# Patient Record
Sex: MALE | Race: WHITE | Employment: FULL TIME | URBAN - METROPOLITAN AREA
[De-identification: names, ages, dates, MRNs, and addresses within clinical notes are randomized per-mention and may not be internally consistent; named-entity substitution may affect disease eponyms.]

---

## 2017-02-15 PROBLEM — K40.90 INGUINAL HERNIA: Status: ACTIVE | Noted: 2017-02-15

## 2018-11-09 ENCOUNTER — HOSPITAL ENCOUNTER (OUTPATIENT)
Dept: PHYSICAL THERAPY | Age: 58
Discharge: HOME | End: 2018-11-09
Payer: COMMERCIAL

## 2018-11-09 PROCEDURE — 97163 PT EVAL HIGH COMPLEX 45 MIN: CPT

## 2018-11-09 PROCEDURE — 97110 THERAPEUTIC EXERCISES: CPT

## 2018-11-09 PROCEDURE — 97535 SELF CARE MNGMENT TRAINING: CPT

## 2018-11-09 NOTE — PROGRESS NOTES
PT DAILY TREATMENT NOTE 10-18 Patient Name: [facility] Date:2018 : 1960 [x]  Patient  Verified Payor: BLUE CROSS / Plan: [address] [place] [address] / Product Type: PPO / In time:142  Out time:226 Total Treatment Time (min): 44 Visit #: 1 of 12 Medicare/BCBS Only Total Timed Codes (min):  24 1:1 Treatment Time:  44 Treatment Area: Wedge compression fracture of first lumbar vertebra, initial encounter for closed fracture [S32.010A] SUBJECTIVE Pain Level (0-10 scale): 5 Any medication changes, allergies to medications, adverse drug reactions, diagnosis change, or new procedure performed?: [x] No    [] Yes (see summary sheet for update) Subjective functional status/changes:   [] No changes reported See eval 
 
OBJECTIVE 20 min [x]Eval                  []Re-Eval  
 
 
14 min Therapeutic Exercise:  [] See flow sheet :  
Rationale: increase ROM and increase strength to improve the patients ability to perform daily activities Self-care: 10' With 
 [] TE 
 [] TA 
 [] neuro 
 [] other: Patient Education: [x] Review HEP [] Progressed/Changed HEP based on:  
[] positioning   [] body mechanics   [] transfers   [] heat/ice application   
[] other:   
 
Other Objective/Functional Measures:   
 
Pain Level (0-10 scale) post treatment: 3 
 
ASSESSMENT/Changes in Function: see POC Patient will continue to benefit from skilled PT services to modify and progress therapeutic interventions, address functional mobility deficits, address ROM deficits, address strength deficits, analyze and address soft tissue restrictions and analyze and cue movement patterns to attain remaining goals. [x]  See Plan of Care 
[]  See progress note/recertification 
[]  See Discharge Summary Progress towards goals / Updated goals: 
Short Term Goals: To be accomplished in 2 weeks: 1. I and compliant with HEP for self management of symptoms. 2. Ambulate on TM x 5' without exacerbation of symptoms to increase tolerance for community ambulation. Long Term Goals: To be accomplished in 6 weeks: 1. Improve FOTO to 55 to indicate improved function with daily activities. 2. Increase B hip strength to grossly 4/5 to increase ease with community ambulation. 3. Perform core exercises without rectus bulge or pain to indicate improve TA/stability for patient to return to work PLAN 
[]  Upgrade activities as tolerated     [x]  Continue plan of care 
[]  Update interventions per flow sheet      
[]  Discharge due to:_ 
[]  Other:_ Nayan Glover, PT 11/9/2018  3:33 PM 
 
Future Appointments Date Time Provider Keny Grider 11/13/2018  4:30 PM Indio Sims [facility]  
11/16/2018 10:30 AM Flaquita Moore PTA [facility]  
11/20/2018  3:30 PM Flaquita Moore PTA [facility]  
11/23/2018  8:00 AM Flaquita Moore PTA [facility]  
11/27/2018  4:00 PM Flaquita Moore PTA [facility]  
11/30/2018 10:00 AM Jaret Aquino PT [facility]

## 2018-11-09 NOTE — PROGRESS NOTES
In [address]  [address] [place], [address] 
[phone] fax Plan of Care/ Statement of Necessity for Physical Therapy Services Patient name: Joan Morton Start of Care: 2018 Referral source: Jerrell Izquierdo MD : 1960 Medical Diagnosis: Wedge compression fracture of first lumbar vertebra, initial encounter for closed fracture [S32.010A] Payor: BLUE CROSS / Plan: [address] [place] [address] / Product Type: PPO /  Onset Date:18; right inguinal hernia repair 10/19/18 Treatment Diagnosis: LBP 2/2 L1 compression fx Prior Hospitalization: see medical history Provider#: [id number] Medications: Verified on Patient summary List  
 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 Assessment/ key information: 62y.o. year old male presents with CC of back pain and inguinal pain s/p fall from ladder with subsequent L1 compression fx and right hernia repair on 10/19/18. Impairments noted: difficulty with sit <>stand and sit<>supine transfers 2/2 pain; decreased tolerance for prolonged standing/walking; decreased core and hip strength grossly. Patient will benefit from physical therapy to address deficits, and ultimately to return patient to prior level of function. Evaluation Complexity History MEDIUM  Complexity : 1-2 comorbidities / personal factors will impact the outcome/ POC ; Examination HIGH Complexity : 4+ Standardized tests and measures addressing body structure, function, activity limitation and / or participation in recreation  ;Presentation MEDIUM Complexity : Evolving with changing characteristics  ; Clinical Decision Making MEDIUM Complexity : FOTO score of 26-74 Overall Complexity Rating: HIGH  
 Problem List: pain affecting function, decrease ROM, decrease strength, impaired gait/ balance, decrease ADL/ functional abilitiies, decrease activity tolerance, decrease flexibility/ joint mobility and decrease transfer abilities Treatment Plan may include any combination of the following: Therapeutic exercise, Neuromuscular re-education, Physical agent/modality, Gait/balance training and Patient education Patient / Family readiness to learn indicated by: asking questions, trying to perform skills and interest 
Persons(s) to be included in education: patient (P) Barriers to Learning/Limitations: Hernia repair limits the exercises that patient can safely perform for LBP Patient Goal (s): to excel in healing process and heal faster Patient Self Reported Health Status: poor Rehabilitation Potential: good Short Term Goals: To be accomplished in 2 weeks: 1. I and compliant with HEP for self management of symptoms. 2. Ambulate on TM x 5' without exacerbation of symptoms to increase tolerance for community ambulation. Long Term Goals: To be accomplished in 6 weeks: 1. Improve FOTO to 55 to indicate improved function with daily activities. 2. Increase B hip strength to grossly 4/5 to increase ease with community ambulation. 3. Perform core exercises without rectus bulge or pain to indicate improve TA/stability for patient to return to work. Frequency / Duration: Patient to be seen 2 times per week for 6 weeks. Patient/ Caregiver education and instruction: Diagnosis, prognosis, self care, activity modification and exercises 
 [x]  Plan of care has been reviewed with JODIE Luo, PT 11/9/2018 3:21 PM 
 
________________________________________________________________________ I certify that the above Therapy Services are being furnished while the patient is under my care. I agree with the treatment plan and certify that this therapy is necessary. [de-identified] Signature:____________Date:_________TIME:________ 
 
[employer], Date and Time must be completed for valid certification ** Please sign and return to [address] [place], [address] 
[phone] fax

## 2018-11-13 ENCOUNTER — HOSPITAL ENCOUNTER (OUTPATIENT)
Dept: PHYSICAL THERAPY | Age: 58
Discharge: HOME | End: 2018-11-13
Payer: COMMERCIAL

## 2018-11-13 PROCEDURE — 97112 NEUROMUSCULAR REEDUCATION: CPT

## 2018-11-13 PROCEDURE — 97110 THERAPEUTIC EXERCISES: CPT

## 2018-11-13 NOTE — PROGRESS NOTES
PT DAILY TREATMENT NOTE 10-18 Patient Name: [facility] Date:2018 : 1960 [x]  Patient  Verified Payor: BLUE CROSS / Plan: [address] [place] [address] / Product Type: PPO / In time:4:33  Out time:5:04 Total Treatment Time (min): 31 Visit #: 2 of 12 Medicare/BCBS Only Total Timed Codes (min):  31 1:1 Treatment Time:  31  
 
 
Treatment Area: Wedge compression fracture of first lumbar vertebra, initial encounter for closed fracture [S32.010A] SUBJECTIVE Pain Level (0-10 scale): 2-3/10 Any medication changes, allergies to medications, adverse drug reactions, diagnosis change, or new procedure performed?: [x] No    [] Yes (see summary sheet for update) Subjective functional status/changes:   [] No changes reported He reports HEP semi-compliance. OBJECTIVE 23 min Therapeutic Exercise:  [x] See flow sheet :  
Rationale: increase ROM, increase strength and improve coordination to improve the patients ability to increase ease with ADLs 8 min Neuromuscular Re-education:  [x]  See flow sheet :   
Rationale: increase strength, improve coordination and increase proprioception  to improve the patients ability to improve hip stability and pelvic awareness With 
 [] TE 
 [] TA 
 [] neuro 
 [] other: Patient Education: [x] Review HEP [] Progressed/Changed HEP based on:  
[] positioning   [] body mechanics   [] transfers   [] heat/ice application   
[] other:   
 
Other Objective/Functional Measures:  
First follow up session---cues sequencing and correct form throughout Pain Level (0-10 scale) post treatment: 2-3/10 ASSESSMENT/Changes in Function:  
Initiated POC per flowsheet. Patient puts forth good effort with exercises. Slow and guarded movement throughout session. He reports HEP semi-compliance.  
 
 
Patient will continue to benefit from skilled PT services to modify and progress therapeutic interventions, address functional mobility deficits, address ROM deficits, address strength deficits, analyze and address soft tissue restrictions, analyze and cue movement patterns, analyze and modify body mechanics/ergonomics and assess and modify postural abnormalities to attain remaining goals. []  See Plan of Care 
[]  See progress note/recertification 
[]  See Discharge Summary Progress towards goals / Updated goals: 
Short Term Goals: To be accomplished in 2 weeks: 
             1. I and compliant with HEP for self management of symptoms.  
2. Ambulate on TM x 5' without exacerbation of symptoms to increase tolerance for community ambulation. Long Term Goals: To be accomplished in 6 weeks: 
             1. Improve FOTO to 55 to indicate improved function with daily activities. 2. Increase B hip strength to grossly 4/5 to increase ease with community ambulation. 3. Perform core exercises without rectus bulge or pain to indicate improve TA/stability for patient to return to work 
  
 
 
 
PLAN 
[]  Upgrade activities as tolerated     [x]  Continue plan of care 
[]  Update interventions per flow sheet      
[]  Discharge due to:_ 
[]  Other:_   
 
Hima Thorpe 11/13/2018  4:31 PM 
 
Future Appointments Date Time Provider Keny Grider 11/16/2018 10:30 AM Ramonita Hoffman PTA [facility]  
11/20/2018  3:30 PM Ramonita Hoffman PTA [facility]  
11/23/2018  8:00 AM Ramonita Hoffman PTA [facility]  
11/27/2018  4:00 PM Ramonita Hoffman PTA [facility]  
11/30/2018 10:00 AM Kevin Almeida, PT [facility]

## 2018-11-16 ENCOUNTER — HOSPITAL ENCOUNTER (OUTPATIENT)
Dept: PHYSICAL THERAPY | Age: 58
Discharge: HOME | End: 2018-11-16
Payer: COMMERCIAL

## 2018-11-16 PROCEDURE — 97112 NEUROMUSCULAR REEDUCATION: CPT

## 2018-11-16 PROCEDURE — 97110 THERAPEUTIC EXERCISES: CPT

## 2018-11-16 NOTE — PROGRESS NOTES
PT DAILY TREATMENT NOTE 10-18 Patient Name: [facility] Date:2018 : 1960 [x]  Patient  Verified Payor: BLUE CROSS / Plan: [address] [place] [address] / Product Type: PPO / In time:10:33  Out time:11:23 Total Treatment Time (min): 50 Visit #: 3 of 12 Medicare/BCBS Only Total Timed Codes (min):  40 1:1 Treatment Time:  21 Treatment Area: Wedge compression fracture of first lumbar vertebra, initial encounter for closed fracture [S32.010A] SUBJECTIVE Pain Level (0-10 scale): 3-4/10 Any medication changes, allergies to medications, adverse drug reactions, diagnosis change, or new procedure performed?: [x] No    [] Yes (see summary sheet for update) Subjective functional status/changes:   [] No changes reported \"Considering everything I'm okay. \" OBJECTIVE Modality rationale: decrease pain to improve the patients ability to perform ADL's. Min Type Additional Details  
 [] Estim:  []Unatt       []IFC  []Premod []Other:  []w/ice   []w/heat Position: Location:  
 [] Estim: []Att    []TENS instruct  []NMES []Other:  []w/US   []w/ice   []w/heat Position: Location:  
 []  Traction: [] Cervical       []Lumbar 
                     [] Prone          []Supine []Intermittent   []Continuous Lbs: 
[] before manual 
[] after manual  
 []  Ultrasound: []Continuous   [] Pulsed []1MHz   []3MHz W/cm2: 
Location:  
 []  Iontophoresis with dexamethasone Location: [] Take home patch  
[] In clinic  
10 [x]  Ice     []  heat 
[]  Ice massage 
[]  Laser  
[]  Anodyne Position: Supine Location: L/S  
 []  Laser with stim 
[]  Other:  Position: Location:  
 []  Vasopneumatic Device Pressure:       [] lo [] med [] hi  
Temperature: [] lo [] med [] hi  
[] Skin assessment post-treatment:  []intact []redness- no adverse reaction 
  []redness  adverse reaction: 32 min Therapeutic Exercise:  [x] See flow sheet :  
Rationale: increase ROM and increase strength to improve the patients ability to perform ADL's. 
  
8 min Neuromuscular Re-education:  [x]  See flow sheet :  
Rationale: increase strength and increase proprioception  to improve the patients ability to perform functional activities. With 
 [x] TE 
 [] TA 
 [] neuro 
 [] other: Patient Education: [x] Review HEP [] Progressed/Changed HEP based on:  
[] positioning   [] body mechanics   [] transfers   [] heat/ice application   
[] other:   
 
Other Objective/Functional Measures: Increased several exercises to 15 reps. Pain Level (0-10 scale) post treatment: 0/10 ASSESSMENT/Changes in Function: Performs exercises slowly and guarded, fully participated in treatment. Patient will continue to benefit from skilled PT services to modify and progress therapeutic interventions, address functional mobility deficits, address ROM deficits, address strength deficits, analyze and cue movement patterns and analyze and modify body mechanics/ergonomics to attain remaining goals. [x]  See Plan of Care 
[]  See progress note/recertification 
[]  See Discharge Summary Progress towards goals / Updated goals: 
Short Term Goals: To be accomplished in 2 weeks: 
             1. I and compliant with HEP for self management of symptoms.  - Pt reports HEP compliance. 11/16/2018 2. Ambulate on TM x 5' without exacerbation of symptoms to increase tolerance for community ambulation. Long Term Goals: To be accomplished in 6 weeks: 
             1. Improve FOTO to 55 to indicate improved function with daily activities. 2. Increase B hip strength to grossly 4/5 to increase ease with community ambulation. 3. Perform core exercises without rectus bulge or pain to indicate improve TA/stability for patient to return to work PLAN 
[]  Upgrade activities as tolerated     [x]  Continue plan of care []  Update interventions per flow sheet      
[]  Discharge due to:_ 
[]  Other:_   
 
Mo Simms, PTA 11/16/2018  10:34 AM 
 
Future Appointments Date Time Provider Keny Grider 11/20/2018  3:30 PM Richard Gamez, JODIE [facility]  
11/23/2018  8:00 AM Richard Gamez, PTA [facility]  
11/27/2018  4:00 PM Richard Gamez, PTA [facility]  
11/30/2018 10:00 AM Zahira Gage, PT [facility]

## 2018-11-20 ENCOUNTER — HOSPITAL ENCOUNTER (OUTPATIENT)
Dept: PHYSICAL THERAPY | Age: 58
Discharge: HOME | End: 2018-11-20
Payer: COMMERCIAL

## 2018-11-20 PROCEDURE — 97110 THERAPEUTIC EXERCISES: CPT

## 2018-11-20 PROCEDURE — 97112 NEUROMUSCULAR REEDUCATION: CPT

## 2018-11-20 NOTE — PROGRESS NOTES
PT DAILY TREATMENT NOTE 10-18 Patient Name: [facility] Date:2018 : 1960 [x]  Patient  Verified Payor: BLUE CROSS / Plan: [address] [place] [address] / Product Type: PPO / In time:3:31  Out time:4:15 Total Treatment Time (min): 44 Visit #: 4 of 12 Medicare/BCBS Only Total Timed Codes (min):  34 1:1 Treatment Time:  34 Treatment Area: Wedge compression fracture of first lumbar vertebra, initial encounter for closed fracture [S32.010A] SUBJECTIVE Pain Level (0-10 scale): 3-410 Any medication changes, allergies to medications, adverse drug reactions, diagnosis change, or new procedure performed?: [x] No    [] Yes (see summary sheet for update) Subjective functional status/changes:   [] No changes reported \"Sore today, did stuff I shouldn't have done yesterday and I'm paying for it today. \" OBJECTIVE Modality rationale: decrease inflammation and decrease pain to improve the patients ability to perform ADL's. Min Type Additional Details  
 [] Estim:  []Unatt       []IFC  []Premod []Other:  []w/ice   []w/heat Position: Location:  
 [] Estim: []Att    []TENS instruct  []NMES []Other:  []w/US   []w/ice   []w/heat Position: Location:  
 []  Traction: [] Cervical       []Lumbar 
                     [] Prone          []Supine []Intermittent   []Continuous Lbs: 
[] before manual 
[] after manual  
 []  Ultrasound: []Continuous   [] Pulsed []1MHz   []3MHz W/cm2: 
Location:  
 []  Iontophoresis with dexamethasone Location: [] Take home patch  
[] In clinic  
10 [x]  Ice     []  heat 
[]  Ice massage 
[]  Laser  
[]  Anodyne Position: Supine Location: L/S  
 []  Laser with stim 
[]  Other:  Position: Location:  
 []  Vasopneumatic Device Pressure:       [] lo [] med [] hi  
Temperature: [] lo [] med [] hi  
 [] Skin assessment post-treatment:  []intact []redness- no adverse reaction 
  []redness  adverse reaction:  
 
26 min Therapeutic Exercise:  [x] See flow sheet :  
Rationale: increase ROM and increase strength to improve the patients ability to perform ADL's. 
 
8 min Neuromuscular Re-education:  [x]  See flow sheet :  
Rationale: increase strength and increase proprioception  to improve the patients ability to perform functional activities. With 
 [x] TE 
 [] TA 
 [] neuro 
 [] other: Patient Education: [x] Review HEP [] Progressed/Changed HEP based on:  
[] positioning   [] body mechanics   [] transfers   [] heat/ice application   
[] other:   
 
Other Objective/Functional Measures: Increased reps for several exercises. Pain Level (0-10 scale) post treatment: 0/10 ASSESSMENT/Changes in Function: TM warm up for 5' without pain level increasing. Pt reported exercises where slightly easier to perform. Decrease in pain level post-modalities. Patient will continue to benefit from skilled PT services to modify and progress therapeutic interventions, address functional mobility deficits, address ROM deficits, address strength deficits, analyze and cue movement patterns and analyze and modify body mechanics/ergonomics to attain remaining goals. [x]  See Plan of Care 
[]  See progress note/recertification 
[]  See Discharge Summary Progress towards goals / Updated goals: 
Short Term Goals: To be accomplished in 2 weeks: 
             1. I and compliant with HEP for self management of symptoms.  - Pt reports HEP compliance. 11/16/2018 2. Ambulate on TM x 5' without exacerbation of symptoms to increase tolerance for community ambulation. - TM warm up for 5' without pain level increasing. 11/20/2018 Long Term Goals: To be accomplished in 6 weeks: 
             1. Improve FOTO to 55 to indicate improved function with daily activities. 2. Increase B hip strength to grossly 4/5 to increase ease with community ambulation. 3. Perform core exercises without rectus bulge or pain to indicate improve TA/stability for patient to return to work PLAN 
[]  Upgrade activities as tolerated     [x]  Continue plan of care 
[]  Update interventions per flow sheet      
[]  Discharge due to:_ 
[]  Other:_   
 
Graciela Lyons PTA 11/20/2018  3:30 PM 
 
Future Appointments Date Time Provider Keny Grider 11/23/2018  8:00 AM Eveline Ford PTA [facility]  
11/27/2018  4:00 PM Eveline Ford PTA [facility]  
11/30/2018 10:00 AM Larry Almendarez PT [facility]

## 2018-11-23 ENCOUNTER — APPOINTMENT (OUTPATIENT)
Dept: PHYSICAL THERAPY | Age: 58
End: 2018-11-23
Payer: COMMERCIAL

## 2018-11-27 ENCOUNTER — HOSPITAL ENCOUNTER (OUTPATIENT)
Dept: PHYSICAL THERAPY | Age: 58
Discharge: HOME | End: 2018-11-27
Payer: COMMERCIAL

## 2018-11-27 PROCEDURE — 97110 THERAPEUTIC EXERCISES: CPT

## 2018-11-27 PROCEDURE — 97112 NEUROMUSCULAR REEDUCATION: CPT

## 2018-11-27 NOTE — PROGRESS NOTES
PT DAILY TREATMENT NOTE 10-18 Patient Name: [facility] Date:2018 : 1960 [x]  Patient  Verified Payor: BASHIR SAMMIE / Plan: [address] [place] [address] / Product Type: PPO / In time:4:10  Out time:4:52 Total Treatment Time (min): 42 Visit #: 5 of 12 Medicare/BCBS Only Total Timed Codes (min):  32 1:1 Treatment Time:  32 Treatment Area: Wedge compression fracture of first lumbar vertebra, initial encounter for closed fracture [S32.010A] SUBJECTIVE Pain Level (0-10 scale): 0/10 Any medication changes, allergies to medications, adverse drug reactions, diagnosis change, or new procedure performed?: [x] No    [] Yes (see summary sheet for update) Subjective functional status/changes:   [] No changes reported \"No pain right now, just very stiff. \" 
Pt late for appointment. OBJECTIVE Modality rationale: decrease inflammation and decrease pain to improve the patients ability to perform ADL's. Min Type Additional Details  
 [] Estim:  []Unatt       []IFC  []Premod []Other:  []w/ice   []w/heat Position: Location:  
 [] Estim: []Att    []TENS instruct  []NMES []Other:  []w/US   []w/ice   []w/heat Position: Location:  
 []  Traction: [] Cervical       []Lumbar 
                     [] Prone          []Supine []Intermittent   []Continuous Lbs: 
[] before manual 
[] after manual  
 []  Ultrasound: []Continuous   [] Pulsed []1MHz   []3MHz W/cm2: 
Location:  
 []  Iontophoresis with dexamethasone Location: [] Take home patch  
[] In clinic  
10 [x]  Ice     []  heat 
[]  Ice massage 
[]  Laser  
[]  Anodyne Position: Seated Location: L/S  
 []  Laser with stim 
[]  Other:  Position: Location:  
 []  Vasopneumatic Device Pressure:       [] lo [] med [] hi  
Temperature: [] lo [] med [] hi  
[] Skin assessment post-treatment:  []intact []redness- no adverse reaction []redness  adverse reaction:  
 
22 min Therapeutic Exercise:  [x] See flow sheet :  
Rationale: increase ROM and increase strength to improve the patients ability to perform ADL's. 10 min Neuromuscular Re-education:  [x]  See flow sheet :  
Rationale: increase strength and increase proprioception  to improve the patients ability to perform functional activities. With 
 [x] TE 
 [] TA 
 [] neuro 
 [] other: Patient Education: [x] Review HEP [] Progressed/Changed HEP based on:  
[] positioning   [] body mechanics   [] transfers   [] heat/ice application   
[] other:   
 
Other Objective/Functional Measures:   
 
Pain Level (0-10 scale) post treatment: 0/10 ASSESSMENT/Changes in Function: FOTO 51%. Demonstrated improved ease with exercises. Discomfort with 1/2 prone series only per pt. Continue PT with additional challenging exercises as tolerable. Patient will continue to benefit from skilled PT services to modify and progress therapeutic interventions, address functional mobility deficits, address ROM deficits, address strength deficits and analyze and cue movement patterns to attain remaining goals. [x]  See Plan of Care 
[]  See progress note/recertification 
[]  See Discharge Summary Progress towards goals / Updated goals: 
Short Term Goals: To be accomplished in 2 weeks: 
             1. I and compliant with HEP for self management of symptoms.  - Pt reports HEP compliance. 11/16/2018 2. Ambulate on TM x 5' without exacerbation of symptoms to increase tolerance for community ambulation. - TM warm up for 5' without pain level increasing. 11/20/2018 Long Term Goals: To be accomplished in 6 weeks: 
             1. Improve FOTO to 55 to indicate improved function with daily activities. - FOTO 51%. 11/27/2018 
2. Increase B hip strength to grossly 4/5 to increase ease with community ambulation.  
3. Perform core exercises without rectus bulge or pain to indicate improve TA/stability for patient to return to work PLAN 
[]  Upgrade activities as tolerated     [x]  Continue plan of care 
[]  Update interventions per flow sheet      
[]  Discharge due to:_ 
[]  Other:_   
 
Becky Persaud PTA 11/27/2018  4:13 PM 
 
Future Appointments Date Time Provider Keny Grider 11/30/2018 10:00 AM Day Quintana, PT [facility]

## 2018-11-30 ENCOUNTER — HOSPITAL ENCOUNTER (OUTPATIENT)
Dept: PHYSICAL THERAPY | Age: 58
Discharge: HOME | End: 2018-11-30
Payer: COMMERCIAL

## 2018-11-30 PROCEDURE — 97112 NEUROMUSCULAR REEDUCATION: CPT

## 2018-11-30 PROCEDURE — 97110 THERAPEUTIC EXERCISES: CPT

## 2018-11-30 NOTE — PROGRESS NOTES
PT DAILY TREATMENT NOTE 10-18 Patient Name: [facility] Date:2018 : 1960 [x]  Patient  Verified Payor: BLUE CROSS / Plan: [address] [place] [address] / Product Type: PPO / In time:1002  Out time:1040 Total Treatment Time (min): 38 Visit #: 6 of 12 Medicare/BCBS Only Total Timed Codes (min):  38 1:1 Treatment Time:  45 Treatment Area: Wedge compression fracture of first lumbar vertebra, initial encounter for closed fracture [S32.010A] SUBJECTIVE Pain Level (0-10 scale): 0 Any medication changes, allergies to medications, adverse drug reactions, diagnosis change, or new procedure performed?: [x] No    [] Yes (see summary sheet for update) Subjective functional status/changes:   [] No changes reported I'm feeling pretty good. OBJECTIVE 8 min Therapeutic Exercise:  [] See flow sheet :  
Rationale: increase ROM and increase strength to improve the patients ability to perform daily activities 30 min Neuromuscular Re-education:  []  See flow sheet :  
Rationale: increase ROM, increase strength and improve coordination  to improve the patients ability to recruit TA and glutes for work tasks With 
 [] TE 
 [] TA 
 [] neuro 
 [] other: Patient Education: [x] Review HEP [] Progressed/Changed HEP based on:  
[] positioning   [] body mechanics   [] transfers   [] heat/ice application   
[] other:   
 
Other Objective/Functional Measures: added supine Trap exercises Pain Level (0-10 scale) post treatment: 0 
 
ASSESSMENT/Changes in Function: Able to recruit TA with cueing, which instantly decreased back pain with standing  exercises.   
 
Patient will continue to benefit from skilled PT services to modify and progress therapeutic interventions, address functional mobility deficits, address strength deficits, analyze and address soft tissue restrictions, analyze and cue movement patterns and assess and modify postural abnormalities to attain remaining goals. []  See Plan of Care 
[]  See progress note/recertification 
[]  See Discharge Summary Progress towards goals / Updated goals: 
Short Term Goals: To be accomplished in 2 weeks: 
             1. I and compliant with HEP for self management of symptoms.  - Pt reports HEP compliance. 11/16/2018 2. Ambulate on TM x 5' without exacerbation of symptoms to increase tolerance for community ambulation. - TM warm up for 5' without pain level increasing. 11/20/2018 Long Term Goals: To be accomplished in 6 weeks: 
             1. Improve FOTO to 55 to indicate improved function with daily activities. - FOTO 51%. 11/27/2018 
2. Increase B hip strength to grossly 4/5 to increase ease with community ambulation. 3. Perform core exercises without rectus bulge or pain to indicate improve TA/stability for patient to return to work PLAN 
[]  Upgrade activities as tolerated     [x]  Continue plan of care 
[]  Update interventions per flow sheet      
[]  Discharge due to:_ 
[]  Other:_ Sathya Loo, PT 11/30/2018  12:16 PM 
 
No future appointments.

## 2018-12-05 ENCOUNTER — HOSPITAL ENCOUNTER (OUTPATIENT)
Dept: PHYSICAL THERAPY | Age: 58
Discharge: HOME | End: 2018-12-05
Payer: COMMERCIAL

## 2018-12-05 PROCEDURE — 97112 NEUROMUSCULAR REEDUCATION: CPT

## 2018-12-05 NOTE — PROGRESS NOTES
PT DAILY TREATMENT NOTE 10-18 Patient Name: [facility] Date:2018 : 1960 [x]  Patient  Verified Payor: BLUE CROSS / Plan: [address] [place] [address] / Product Type: PPO / In time:3:00  Out time:3:39 Total Treatment Time (min): 39 Visit #: 7 of 12 Medicare/BCBS Only Total Timed Codes (min):  39 1:1 Treatment Time:  44 Treatment Area: Wedge compression fracture of first lumbar vertebra, initial encounter for closed fracture [S32.010A] SUBJECTIVE Pain Level (0-10 scale): 0 Any medication changes, allergies to medications, adverse drug reactions, diagnosis change, or new procedure performed?: [x] No    [] Yes (see summary sheet for update) Subjective functional status/changes:   [] No changes reported Patient reports subjective improvement. \"It's much better than it was. \" He returned to work last week. OBJECTIVE 39 min Neuromuscular Re-education:  [x]  See flow sheet :  
Rationale: increase ROM, increase strength, improve coordination, improve balance and increase proprioception  to improve the patients ability to promote principle of axial elongation and improve core awareness in various positioning With 
 [] TE 
 [] TA 
 [] neuro 
 [] other: Patient Education: [x] Review HEP [] Progressed/Changed HEP based on:  
[] positioning   [] body mechanics   [] transfers   [] heat/ice application   
[] other:   
 
Other Objective/Functional Measures:  
Visible core fatigue with trap table activities Pain Level (0-10 scale) post treatment: 0 
 
ASSESSMENT/Changes in Function:  
Patient making very good progress towards initial goals. His trunk ROM is visibly much improved. He is appropriately challenged with core/hip stability with updated flowsheet today. Provided him with updated HEP.   
 
Patient will continue to benefit from skilled PT services to modify and progress therapeutic interventions, address functional mobility deficits, address ROM deficits, address strength deficits, analyze and address soft tissue restrictions, analyze and cue movement patterns, analyze and modify body mechanics/ergonomics and assess and modify postural abnormalities to attain remaining goals. []  See Plan of Care 
[]  See progress note/recertification 
[]  See Discharge Summary Progress towards goals / Updated goals: 
Short Term Goals: To be accomplished in 2 weeks: 
             1. I and compliant with HEP for self management of symptoms.  - Pt reports HEP compliance. 11/16/2018 2. Ambulate on TM x 5' without exacerbation of symptoms to increase tolerance for community ambulation. - TM warm up for 5' without pain level increasing. 11/20/2018 Long Term Goals: To be accomplished in 6 weeks: 
             1. Improve FOTO to 55 to indicate improved function with daily activities. - FOTO 51%. 11/27/2018 
2. Increase B hip strength to grossly 4/5 to increase ease with community ambulation. 3. Perform core exercises without rectus bulge or pain to indicate improve TA/stability for patient to return to work PLAN 
[]  Upgrade activities as tolerated     [x]  Continue plan of care 
[]  Update interventions per flow sheet      
[]  Discharge due to:_ 
[]  Other:_   
 
Yusuf Stoner 12/5/2018  2:54 PM 
 
Future Appointments Date Time Provider Keny Grider 12/5/2018  3:00 PM Antonia Lighter [facility]  
12/7/2018  7:30 AM Tiki Rubio PTA [facility]  
12/10/2018  9:30 AM Antonia Lighter [facility]  
12/12/2018  5:30 PM Tiki Rubio PTA [facility]  
12/14/2018 10:00 AM Elena Pelayo PT [facility]

## 2018-12-07 ENCOUNTER — HOSPITAL ENCOUNTER (OUTPATIENT)
Dept: PHYSICAL THERAPY | Age: 58
Discharge: HOME | End: 2018-12-07
Payer: COMMERCIAL

## 2018-12-07 PROCEDURE — 97112 NEUROMUSCULAR REEDUCATION: CPT

## 2018-12-07 NOTE — PROGRESS NOTES
PT DAILY TREATMENT NOTE 10-18 Patient Name: [facility] Date:2018 : 1960 [x]  Patient  Verified Payor: BLUE CROSS / Plan: [address] [place] [address] / Product Type: PPO / In time:7:30  Out time:8:11 Total Treatment Time (min): 41 Visit #: 8 of 12 Medicare/BCBS Only Total Timed Codes (min):  41 1:1 Treatment Time:  31  
 
 
Treatment Area: Wedge compression fracture of first lumbar vertebra, initial encounter for closed fracture [S32.010A] SUBJECTIVE Pain Level (0-10 scale): 0/10 Any medication changes, allergies to medications, adverse drug reactions, diagnosis change, or new procedure performed?: [x] No    [] Yes (see summary sheet for update) Subjective functional status/changes:   [] No changes reported \"Starts hurting at work after a couple of hours. \" OBJECTIVE 8 min Therapeutic Exercise:  [x] See flow sheet :  
Rationale: increase ROM and increase strength to improve the patients ability to perform ADL's. 
 
33 min Neuromuscular Re-education:  [x]  See flow sheet :  
Rationale: increase strength and increase proprioception  to improve the patients ability to perform functional activities. With 
 [x] TE 
 [] TA 
 [] neuro 
 [] other: Patient Education: [x] Review HEP [] Progressed/Changed HEP based on:  
[] positioning   [] body mechanics   [] transfers   [] heat/ice application   
[] other:   
 
Other Objective/Functional Measures: Added yellow t-band to hip 3-way standing. MMT (B) hips 3+/5 to 4/5 grossly. Pain Level (0-10 scale) post treatment: 0/10 ASSESSMENT/Changes in Function: Limited (B) hip extension strength. Pt given t-band for home use. Reassess next visit for PN, probable continue PT for additional 2-3 weeks.  
 
Patient will continue to benefit from skilled PT services to modify and progress therapeutic interventions, address functional mobility deficits, address ROM deficits, address strength deficits, analyze and cue movement patterns and analyze and modify body mechanics/ergonomics to attain remaining goals. [x]  See Plan of Care 
[]  See progress note/recertification 
[]  See Discharge Summary Progress towards goals / Updated goals: 
Short Term Goals: To be accomplished in 2 weeks: 
             1. I and compliant with HEP for self management of symptoms.  - Pt reports HEP compliance. 11/16/2018 2. Ambulate on TM x 5' without exacerbation of symptoms to increase tolerance for community ambulation. - TM warm up for 5' without pain level increasing. 11/20/2018 Long Term Goals: To be accomplished in 6 weeks: 
             1. Improve FOTO to 55 to indicate improved function with daily activities. - FOTO 51%. 11/27/2018 
2. Increase B hip strength to grossly 4/5 to increase ease with community ambulation. - MMT (B) hips 3+/5 to 4/5 grossly. 12/7/2018 3. Perform core exercises without rectus bulge or pain to indicate improve TA/stability for patient to return to work PLAN 
[]  Upgrade activities as tolerated     [x]  Continue plan of care 
[]  Update interventions per flow sheet      
[]  Discharge due to:_ 
[]  Other:_   
 
Nina Parent, PTA 12/7/2018  7:27 AM 
 
Future Appointments Date Time Provider Keny Grider 12/7/2018  7:30 AM Roxana Kelley PTA [facility] [place]  
12/10/2018  9:30 AM Timothy Lucero [facility] [place]  
12/12/2018  5:30 PM Roxana Kelley PTA [facility] [place]  
12/14/2018 10:00 AM Bryant Delarosa, PT [facility] [place]

## 2018-12-10 ENCOUNTER — HOSPITAL ENCOUNTER (OUTPATIENT)
Dept: PHYSICAL THERAPY | Age: 58
Discharge: HOME | End: 2018-12-10
Payer: COMMERCIAL

## 2018-12-10 PROCEDURE — 97112 NEUROMUSCULAR REEDUCATION: CPT

## 2018-12-10 NOTE — PROGRESS NOTES
PT DAILY TREATMENT NOTE 10-18 Patient Name: [facility] Date:12/10/2018 : 1960 [x]  Patient  Verified Payor: BLUE CROSS / Plan: [address] [place] [address] / Product Type: PPO / In time:9:30  Out time:10:09 Total Treatment Time (min): 39 Visit #: 9 of 12 Medicare/BCBS Only Total Timed Codes (min):  39 1:1 Treatment Time:  25 Treatment Area: Wedge compression fracture of first lumbar vertebra, initial encounter for closed fracture [S32.010A] SUBJECTIVE Pain Level (0-10 scale): 0 Any medication changes, allergies to medications, adverse drug reactions, diagnosis change, or new procedure performed?: [x] No    [] Yes (see summary sheet for update) Subjective functional status/changes:   [] No changes reported Patient continues to report difficulty with prolonged standing greater than 4-5 hours. OBJECTIVE 10 min Therapeutic Exercise:  [x] See flow sheet :  
Rationale: increase ROM, increase strength and improve coordination to improve the patients ability to increase ease with ADLs 29 min Neuromuscular Re-education:  [x]  See flow sheet :  
Rationale: increase ROM, increase strength, improve coordination, improve balance and increase proprioception  to improve the patients ability to ease with prolonged standing With 
 [] TE 
 [] TA 
 [] neuro 
 [] other: Patient Education: [x] Review HEP [] Progressed/Changed HEP based on:  
[] positioning   [] body mechanics   [] transfers   [] heat/ice application   
[] other:   
 
Other Objective/Functional Measures:  
Rectus bulge with supine core activities Pain Level (0-10 scale) post treatment: 0 
 
ASSESSMENT/Changes in Function: He is making steady progress towards goals. Patient's main complaint is prolonged stander greater than 4-5 hours.  We have re-vamped his program over past few visits and we are progressing towards more standing postural endurance work. Patient does continue to present with rectus bulge though this is slowly improving. Continue 2x2-3 weeks. Patient will continue to benefit from skilled PT services to modify and progress therapeutic interventions, address functional mobility deficits, address ROM deficits, address strength deficits, analyze and address soft tissue restrictions, analyze and cue movement patterns, analyze and modify body mechanics/ergonomics and assess and modify postural abnormalities to attain remaining goals. []  See Plan of Care 
[]  See progress note/recertification 
[]  See Discharge Summary Progress towards goals / Updated goals: 
Short Term Goals: To be accomplished in 2 weeks: 
             1. I and compliant with HEP for self management of symptoms.  - Pt reports HEP compliance. 11/16/2018 2. Ambulate on TM x 5' without exacerbation of symptoms to increase tolerance for community ambulation. - TM warm up for 5' without pain level increasing. 11/20/2018 Long Term Goals: To be accomplished in 6 weeks: 
             1. Improve FOTO to 55 to indicate improved function with daily activities. - FOTO 51%. 11/27/2018 
2. Increase B hip strength to grossly 4/5 to increase ease with community ambulation. - MMT (B) hips 3+/5 to 4/5 grossly. 12/7/2018 3. Perform core exercises without rectus bulge or pain to indicate improve TA/stability for patient to return to work-progressing, able to correct rectus bulge with breathe cueing (12/10/2018) PLAN 
[]  Upgrade activities as tolerated     [x]  Continue plan of care 
[]  Update interventions per flow sheet      
[]  Discharge due to:_ 
[]  Other:_   
 
Reid Hook 12/10/2018  9:33 AM 
 
Future Appointments Date Time Provider Keny Grider 12/12/2018  5:30 PM Anne-Marie Briseno, PTA [facility]  
12/14/2018 10:00 AM Teri Claudio, PT [facility]

## 2018-12-11 NOTE — PROGRESS NOTES
In [address]  [address] [place], [address] 
[phone] fax Physical Therapy Progress Note Patient name: Bin Contreras Start of Care: 18 Referral source: John Domínguez MD : 1960 Medical/Treatment Diagnosis: Wedge compression fracture of first lumbar vertebra, initial encounter for closed fracture [S32.010A] Payor: BLUE CROSS / Plan: [address] [place] [address] / Product Type: PPO /  Onset Date::18; right inguinal hernia repair 10/19/18 Prior Hospitalization: see medical history Provider#: [id number] Medications: Verified on Patient Summary List   
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 
[x] Increased ROM   []  [x]  []  [] 
[x] Increased Strength  []  [x]  []  [] 
[x] Increased Mobility  []  [x]  []  []  
[x] Decreased Pain   []  [x]  []  [] 
[] Decreased Swelling  []  []  []  [] 
 
Key Functional Changes: He is making steady progress towards goals. Patient's main complaint is prolonged stander greater than 4-5 hours. We have re-vamped his program over past few visits and we are progressing towards more standing postural endurance work. Patient does continue to present with rectus bulge though this is slowly improving. Continue 2x2-3 weeks Updated Goals: to be achieved in 2-3 weeks: 
 Long Term Goals: To be accomplished in 6 weeks: 
             1. Improve FOTO to 55 to indicate improved function with daily activities. - FOTO 51%. 2018 
2. Increase B hip strength to grossly 4/5 to increase ease with community ambulation. - MMT (B) hips 3+/5 to 4/5 grossly. 2018 3.  Perform core exercises without rectus bulge or pain to indicate improve TA/stability for patient to return to work-progressing, able to correct rectus bulge with breathe cueing (12/10/2018) 
 ASSESSMENT/RECOMMENDATIONS: 
[]Continue therapy per initial plan/protocol at a frequency of  2 x per week for 2-3 weeks []Continue therapy with the following recommended changes:_____________________      _____________________________________________________________________ []Discontinue therapy progressing towards or have reached established goals []Discontinue therapy due to lack of appreciable progress towards goals []Discontinue therapy due to lack of attendance or compliance []Await Physician's recommendations/decisions regarding therapy []Other:________________________________________________________________ Thank you for this referral.   
Dmitry Mosley, PT 12/11/2018 9:22 AM 
NOTE TO PHYSICIAN:  PLEASE COMPLETE THE ORDERS BELOW AND  
FAX TO [facility]: [phone] If you are unable to process this request in 24 hours please contact our office: [phone] ? I have read the above report and request that my patient continue as recommended. ? I have read the above report and request that my patient continue therapy with the following changes/special instructions:__________________________________________________________ ? I have read the above report and request that my patient be discharged from therapy. Physicians signature: ______________________________Date: ______Time:______

## 2018-12-12 ENCOUNTER — HOSPITAL ENCOUNTER (OUTPATIENT)
Dept: PHYSICAL THERAPY | Age: 58
Discharge: HOME | End: 2018-12-12
Payer: COMMERCIAL

## 2018-12-12 PROCEDURE — 97110 THERAPEUTIC EXERCISES: CPT

## 2018-12-12 PROCEDURE — 97112 NEUROMUSCULAR REEDUCATION: CPT

## 2018-12-12 NOTE — PROGRESS NOTES
PT DAILY TREATMENT NOTE 10-18    Patient Name: Elías Ortega  Date:2018  : 1960  [x]  Patient  Verified  Payor: Sharita Stevens / Plan: [address] [place] [address] / Product Type: PPO /    In time:5:32  Out time:6:12  Total Treatment Time (min): 40  Visit #: 1 of 4-6    Medicare/BCBS Only   Total Timed Codes (min):  40 1:1 Treatment Time:  40       Treatment Area: Wedge compression fracture of first lumbar vertebra, initial encounter for closed fracture [S32.010A]    SUBJECTIVE  Pain Level (0-10 scale): 2/10  Any medication changes, allergies to medications, adverse drug reactions, diagnosis change, or new procedure performed?: [x] No    [] Yes (see summary sheet for update)  Subjective functional status/changes:   [] No changes reported  \"Sore today, I had to jump on the line at work last night because we were short staffed. \"    OBJECTIVE    10 min Therapeutic Exercise:  [x] See flow sheet :   Rationale: increase ROM and increase strength to improve the patients ability to perform ADL's. 30 min Neuromuscular Re-education:  [x]  See flow sheet :   Rationale: increase strength and increase proprioception  to improve the patients ability to perform functional activities. With   [x] TE   [] TA   [] neuro   [] other: Patient Education: [x] Review HEP    [] Progressed/Changed HEP based on:   [] positioning   [] body mechanics   [] transfers   [] heat/ice application    [] other:      Other Objective/Functional Measures: Added Treadmill warm up, forward and (B) lateral 2' each. Pain Level (0-10 scale) post treatment: 0/10    ASSESSMENT/Changes in Function: Emphasis on TA recruitment with exercises, visual fatigue while performing supine trapeze table series.     Patient will continue to benefit from skilled PT services to modify and progress therapeutic interventions, address functional mobility deficits, address ROM deficits, address strength deficits, analyze and cue movement patterns and analyze and modify body mechanics/ergonomics to attain remaining goals. [x]  See Plan of Care  []  See progress note/recertification  []  See Discharge Summary         Progress towards goals / Updated goals:  Long Term Goals: To be accomplished in 6 weeks:               1. Improve FOTO to 55 to indicate improved function with daily activities. - FOTO 51%. 11/27/2018  2. Increase B hip strength to grossly 4/5 to increase ease with community ambulation. - MMT (B) hips 3+/5 to 4/5 grossly. 12/7/2018  3.  Perform core exercises without rectus bulge or pain to indicate improve TA/stability for patient to return to work-progressing, able to correct rectus bulge with breathe cueing (12/10/2018)    PLAN  []  Upgrade activities as tolerated     [x]  Continue plan of care  []  Update interventions per flow sheet       []  Discharge due to:_  []  Other:_      Ever Zapien PTA 12/12/2018  5:35 PM    Future Appointments   Date Time Provider Keny Grider   12/14/2018 10:00 AM [facility]   12/18/2018  5:00 PM [facility]   12/20/2018  5:00 PM [facility]   12/26/2018  7:00 AM [facility]   12/28/2018  7:00 AM [facility]

## 2018-12-14 ENCOUNTER — HOSPITAL ENCOUNTER (OUTPATIENT)
Dept: PHYSICAL THERAPY | Age: 58
Discharge: HOME | End: 2018-12-14
Payer: COMMERCIAL

## 2018-12-14 PROCEDURE — 97112 NEUROMUSCULAR REEDUCATION: CPT

## 2018-12-14 NOTE — PROGRESS NOTES
PT DAILY TREATMENT NOTE 10-18    Patient Name: Anel Quiñonez  Date:2018  : 1960  [x]  Patient  Verified  Payor: Sydnee Pace / Plan: [address] [place] [address] / Product Type: PPO /    In time:1000  Out time:1048  Total Treatment Time (min): 48  Visit #: 2 of 4-6    Medicare/BCBS Only   Total Timed Codes (min):  48 1:1 Treatment Time:  40       Treatment Area: Wedge compression fracture of first lumbar vertebra, initial encounter for closed fracture [S32.010A]    SUBJECTIVE  Pain Level (0-10 scale): 0  Any medication changes, allergies to medications, adverse drug reactions, diagnosis change, or new procedure performed?: [x] No    [] Yes (see summary sheet for update)  Subjective functional status/changes:   [] No changes reported  I can tolerate about 2 hours at work and then my back starts to hurt. OBJECTIVE      8 min Therapeutic Exercise:  [] See flow sheet :   Rationale: increase strength to improve the patients ability to tolerate prolonged standing at work     40 min Neuromuscular Re-education:  []  See flow sheet :   Rationale: increase strength  to improve the patients ability to recruit TA and glutes for work tasks        With   [] TE   [] TA   [] neuro   [] other: Patient Education: [x] Review HEP    [] Progressed/Changed HEP based on:   [] positioning   [] body mechanics   [] transfers   [] heat/ice application    [] other:      Other Objective/Functional Measures: added step ups with UE gtb sh ext     Pain Level (0-10 scale) post treatment: 0    ASSESSMENT/Changes in Function: Added s/l hip glute work on trap table. Required cueing to correct rib flare. Patient will continue to benefit from skilled PT services to modify and progress therapeutic interventions, address strength deficits, analyze and cue movement patterns and assess and modify postural abnormalities to attain remaining goals.      []  See Plan of Care  []  See progress note/recertification  []  See Discharge Summary         Progress towards goals / Updated goals:  Long Term Goals: To be accomplished in 6 weeks:               1. Improve FOTO to 55 to indicate improved function with daily activities. - FOTO 51%. 11/27/2018  2. Increase B hip strength to grossly 4/5 to increase ease with community ambulation. - MMT (B) hips 3+/5 to 4/5 grossly. 12/7/2018  3.  Perform core exercises without rectus bulge or pain to indicate improve TA/stability for patient to return to work-progressing, able to correct rectus bulge with breathe cueing (12/10/2018)           PLAN  []  Upgrade activities as tolerated     []  Continue plan of care  []  Update interventions per flow sheet       []  Discharge due to:_  []  Other:_      Ines Lau, PT 12/14/2018  10:30 AM    Future Appointments   Date Time Provider Keny Grider   12/18/2018  5:00 PM [facility]   12/20/2018  5:00 PM [facility]   12/26/2018  7:00 AM [facility]   12/28/2018  7:00 AM Anuel Javier PTA [facility]

## 2018-12-18 ENCOUNTER — HOSPITAL ENCOUNTER (OUTPATIENT)
Dept: PHYSICAL THERAPY | Age: 58
Discharge: HOME | End: 2018-12-18
Payer: COMMERCIAL

## 2018-12-18 PROCEDURE — 97112 NEUROMUSCULAR REEDUCATION: CPT

## 2018-12-18 NOTE — PROGRESS NOTES
PT DAILY TREATMENT NOTE 10-18    Patient Name: Lolly Coto  Date:2018  : 1960  [x]  Patient  Verified   Payor: Patricia Laughlin / Plan: [address] [place] [address] / Product Type: PPO /    In time:5:06  Out time:5:46  Total Treatment Time (min): 40  Visit #: 3 of 4-6    Medicare/BCBS Only   Total Timed Codes (min):  40 1:1 Treatment Time:  28       Treatment Area: Wedge compression fracture of first lumbar vertebra, initial encounter for closed fracture [S32.010A]    SUBJECTIVE  Pain Level (0-10 scale): 2-310  Any medication changes, allergies to medications, adverse drug reactions, diagnosis change, or new procedure performed?: [x] No    [] Yes (see summary sheet for update)  Subjective functional status/changes:   [] No changes reported  \"Kind of hurting today, not sure why. \"    OBJECTIVE    10 min Therapeutic Exercise:  [x] See flow sheet :   Rationale: increase ROM and increase strength to improve the patients ability to perform ADL's. 30 min Neuromuscular Re-education:  [x]  See flow sheet :   Rationale: increase strength and increase proprioception  to improve the patients ability to perform functional activities. With   [x] TE   [] TA   [] neuro   [] other: Patient Education: [x] Review HEP    [] Progressed/Changed HEP based on:   [] positioning   [] body mechanics   [] transfers   [] heat/ice application    [] other:      Other Objective/Functional Measures: No change in there ex. Pain Level (0-10 scale) post treatment: 2-310    ASSESSMENT/Changes in Function: challenged with trapeze supine series today, fatigue noted in core. No change in pain level post-treatment. Pt reports symptoms have been slightly exacerbated today, feels achy.     Patient will continue to benefit from skilled PT services to modify and progress therapeutic interventions, address functional mobility deficits, address ROM deficits, address strength deficits, analyze and cue movement patterns and analyze and modify body mechanics/ergonomics to attain remaining goals. [x]  See Plan of Care  []  See progress note/recertification  []  See Discharge Summary         Progress towards goals / Updated goals:  Long Term Goals: To be accomplished in 6 weeks:               1. Improve FOTO to 55 to indicate improved function with daily activities. - FOTO 51%. 11/27/2018  2. Increase B hip strength to grossly 4/5 to increase ease with community ambulation. - MMT (B) hips 3+/5 to 4/5 grossly. 12/7/2018  3.  Perform core exercises without rectus bulge or pain to indicate improve TA/stability for patient to return to work-progressing, able to correct rectus bulge with breathe cueing (12/10/2018)    PLAN  []  Upgrade activities as tolerated     [x]  Continue plan of care  []  Update interventions per flow sheet       []  Discharge due to:_  []  Other:_      Verlinda Forbes, PTA 12/18/2018  5:23 PM    Future Appointments   Date Time Provider Keny Grider   12/20/2018  5:00 PM Aldo Mendieta [facility]   12/26/2018  7:00 [facility]   12/28/2018  7:00 AM Mayra Bolden PTA [facility]

## 2018-12-20 ENCOUNTER — HOSPITAL ENCOUNTER (OUTPATIENT)
Dept: PHYSICAL THERAPY | Age: 58
Discharge: HOME | End: 2018-12-20
Payer: COMMERCIAL

## 2018-12-20 PROCEDURE — 97112 NEUROMUSCULAR REEDUCATION: CPT

## 2018-12-20 PROCEDURE — 97110 THERAPEUTIC EXERCISES: CPT

## 2018-12-20 NOTE — PROGRESS NOTES
PT DAILY TREATMENT NOTE 10-18    Patient Name: Angelia Juarez  Date:2018  : 1960  [x]  Patient  Verified  Payor: Susy Comer / Plan: [address] [place] [address] / Product Type: PPO /    In time:5:01  Out time:5:43  Total Treatment Time (min): 42  Visit #: 4 of 4-6    Medicare/BCBS Only   Total Timed Codes (min):  42 1:1 Treatment Time:  40       Treatment Area: Wedge compression fracture of first lumbar vertebra, initial encounter for closed fracture [S32.010A]    SUBJECTIVE  Pain Level (0-10 scale): 1  Any medication changes, allergies to medications, adverse drug reactions, diagnosis change, or new procedure performed?: [x] No    [] Yes (see summary sheet for update)  Subjective functional status/changes:   [] No changes reported  \"I remembered what you said the other day and tried to activate my core yesterday at work. It helped. \"    OBJECTIVE    15 min Therapeutic Exercise:  [x] See flow sheet :   Rationale: increase ROM, increase strength and improve coordination to improve the patients ability to increase ease with ADLs      27 min Neuromuscular Re-education:  [x]  See flow sheet :   Rationale: increase strength, improve coordination and increase proprioception  to improve the patients ability to improve core activation and axial elongation            With   [] TE   [] TA   [] neuro   [] other: Patient Education: [x] Review HEP    [] Progressed/Changed HEP based on:   [] positioning   [] body mechanics   [] transfers   [] heat/ice application    [] other:      Other Objective/Functional Measures:   Bilateral hip strength grossly 3+/5     Pain Level (0-10 scale) post treatment: 0    ASSESSMENT/Changes in Function:   Patient is improving in core awareness per subjective reports and as evidenced with ability to decrease rectus. Due to his long standing shifts, added exercises to promote core awareness in prolonged standing. He does well besides Lawson with some c/o slow back discomfort.  Most likely discharge in two visits with updated HEP. Patient will continue to benefit from skilled PT services to modify and progress therapeutic interventions, address functional mobility deficits, address ROM deficits, address strength deficits, analyze and address soft tissue restrictions, analyze and cue movement patterns, analyze and modify body mechanics/ergonomics and assess and modify postural abnormalities to attain remaining goals. []  See Plan of Care  []  See progress note/recertification  []  See Discharge Summary         Progress towards goals / Updated goals:  Long Term Goals: To be accomplished in 6 weeks:               1. Improve FOTO to 55 to indicate improved function with daily activities. - FOTO 51%. 11/27/2018  2. Increase B hip strength to grossly 4/5 to increase ease with community ambulation. - MMT (B) hips 3+/5 to 4/5 grossly. 12/7/2018 grossly 3+/5 (12/20/2018)  3.  Perform core exercises without rectus bulge or pain to indicate improve TA/stability for patient to return to work-progressing, able to correct rectus bulge with breathe cueing (12/10/2018)        PLAN  [x]  Upgrade activities as tolerated     [x]  Continue plan of care  []  Update interventions per flow sheet       []  Discharge due to:_  []  Other:_      Jose Manuel Duenas 12/20/2018  4:50 PM    Future Appointments   Date Time Provider Keny Grider   12/20/2018  5:00 PM [facility]   12/26/2018  7:00 AM [facility]   12/28/2018  7:00 AM [facility]illen [facility]

## 2018-12-26 ENCOUNTER — HOSPITAL ENCOUNTER (OUTPATIENT)
Dept: PHYSICAL THERAPY | Age: 58
Discharge: HOME | End: 2018-12-26
Payer: COMMERCIAL

## 2018-12-26 PROCEDURE — 97112 NEUROMUSCULAR REEDUCATION: CPT

## 2018-12-26 PROCEDURE — 97110 THERAPEUTIC EXERCISES: CPT

## 2018-12-26 NOTE — PROGRESS NOTES
PT DAILY TREATMENT NOTE 10-18    Patient Name: Melita Garcia  Date:2018  : 1960  [x]  Patient  Verified  Payor: Gilma Ochoa / Plan: [address] [place] [address] / Product Type: PPO /    In time:7:01  Out time:7:40  Total Treatment Time (min): 39  Visit #: 5 of 4-6    Medicare/BCBS Only   Total Timed Codes (min):  39 1:1 Treatment Time:  39       Treatment Area: Wedge compression fracture of first lumbar vertebra, initial encounter for closed fracture [S32.010A]    SUBJECTIVE  Pain Level (0-10 scale): 0  Any medication changes, allergies to medications, adverse drug reactions, diagnosis change, or new procedure performed?: [x] No    [] Yes (see summary sheet for update)  Subjective functional status/changes:   [] No changes reported  Patient reports no new complaints. OBJECTIVE    15 min Therapeutic Exercise:  [x] See flow sheet :   Rationale: increase ROM, increase strength and improve coordination to improve the patients ability to increase ease with ADLs      24 min Neuromuscular Re-education:  [x]  See flow sheet :   Rationale: increase ROM, increase strength, improve coordination, improve balance and increase proprioception  to improve the patients ability to improve core awareness and ease with prolonged standing shifts              With   [] TE   [] TA   [] neuro   [] other: Patient Education: [x] Review HEP    [] Progressed/Changed HEP based on:   [] positioning   [] body mechanics   [] transfers   [] heat/ice application    [] other:      Other Objective/Functional Measures:   Occasional rectus bulge noted in standing     Pain Level (0-10 scale) post treatment: 0    ASSESSMENT/Changes in Function:   Patient has made good progress towards updated goals. Pain remains low and he is able to perform increased standing therex indicating ease with prolonged standing shifts. We will discharge next visit with updated HEP PRN.      Patient will continue to benefit from skilled PT services to modify and progress therapeutic interventions, address functional mobility deficits, address ROM deficits, address strength deficits, analyze and address soft tissue restrictions, analyze and cue movement patterns, analyze and modify body mechanics/ergonomics and assess and modify postural abnormalities to attain remaining goals. []  See Plan of Care  []  See progress note/recertification  []  See Discharge Summary         Progress towards goals / Updated goals:  Long Term Goals: To be accomplished in 6 weeks:               1. Improve FOTO to 55 to indicate improved function with daily activities. - FOTO 51%. 11/27/2018  2. Increase B hip strength to grossly 4/5 to increase ease with community ambulation. - MMT (B) hips 3+/5 to 4/5 grossly. 12/7/2018 grossly 3+/5 (12/20/2018)  3.  Perform core exercises without rectus bulge or pain to indicate improve TA/stability for patient to return to work-progressing, able to correct rectus bulge with breathe cueing (12/10/2018) progressing, occasional rectus bulge though his core awareness is much improved (12/26/2018)        PLAN  []  Upgrade activities as tolerated     [x]  Continue plan of care  []  Update interventions per flow sheet       []  Discharge due to:_  []  Other:_      Lesly Williamson 12/26/2018  7:06 AM    Future Appointments   Date Time Provider Keny Grider   12/28/2018  7:00 AM Eveline Ford PTA [facility]

## 2018-12-28 ENCOUNTER — HOSPITAL ENCOUNTER (OUTPATIENT)
Dept: PHYSICAL THERAPY | Age: 58
Discharge: HOME | End: 2018-12-28
Payer: COMMERCIAL

## 2018-12-28 PROCEDURE — 97110 THERAPEUTIC EXERCISES: CPT

## 2018-12-28 PROCEDURE — 97112 NEUROMUSCULAR REEDUCATION: CPT

## 2018-12-28 NOTE — PROGRESS NOTES
PT DAILY TREATMENT NOTE 10-18 Patient Name: [facility] Date:2018 : 1960 [x]  Patient  Verified Payor: BLUE [employer]OSS / Plan: [address] [place] [address] / Product Type: PPO / In time:7:04  Out time:7:40 Total Treatment Time (min): 36 Visit #: 6 of 4-6 Medicare/BCBS Only Total Timed Codes (min):  36 1:1 Treatment Time:  36  
 
 
Treatment Area: Wedge compression fracture of first lumbar vertebra, initial encounter for closed fracture [S32.010A] SUBJECTIVE Pain Level (0-10 scale): 0/10 Any medication changes, allergies to medications, adverse drug reactions, diagnosis change, or new procedure performed?: [x] No    [] Yes (see summary sheet for update) Subjective functional status/changes:   [] No changes reported \"Too early to have pain. \" OBJECTIVE 10 min Therapeutic Exercise:  [x] See flow sheet :  
Rationale: increase ROM and increase strength to improve the patients ability to perform ADL's. 
 
26 min Neuromuscular Re-education:  [x]  See flow sheet :  
Rationale: increase strength and increase proprioception  to improve the patients ability to perform functional activities. With 
 [x] TE 
 [] TA 
 [] neuro 
 [] other: Patient Education: [x] Review HEP [] Progressed/Changed HEP based on:  
[] positioning   [] body mechanics   [] transfers   [] heat/ice application   
[] other:   
 
Other Objective/Functional Measures: Pt reports high 90% overall improvement. FOTO 72%. Pain Level (0-10 scale) post treatment: 0/10 ASSESSMENT/Changes in Function:  
  
[]  See Plan of Care 
[]  See progress note/recertification 
[x]  See Discharge Summary Progress towards goals / Updated goals: 
Long Term Goals: To be accomplished in 6 weeks: 
             1. Improve FOTO to 55 to indicate improved function with daily activities. - FOTO 51%. 2018; FOTO 72%. 2018 
2.  Increase B hip strength to grossly 4/5 to increase ease with community ambulation. - MMT (B) hips 3+/5 to 4/5 grossly. 12/7/2018 grossly 3+/5 (12/20/2018) 3. Perform core exercises without rectus bulge or pain to indicate improve TA/stability for patient to return to work-progressing, able to correct rectus bulge with breathe cueing (12/10/2018) progressing, occasional rectus bulge though his core awareness is much improved (12/26/2018) PLAN 
[]  Upgrade activities as tolerated     []  Continue plan of care 
[]  Update interventions per flow sheet [x]  Discharge due to: [facility] LTG's. Pt given discharge instructions. []  Other:_   
 
Cristo Elliott, PTA 12/28/2018  7:05 AM 
 
No future appointments.

## 2019-01-04 NOTE — PROGRESS NOTES
In [address]  [place] [address] [place], [address] 
[phone] fax Physical Therapy Discharge Summary Patient name: Odin Zimmerman Start of Care: 18 Referral source: Harper Wiggins MD : 1960 Medical/Treatment Diagnosis: Wedge compression fracture of first lumbar vertebra, initial encounter for closed fracture [S32.010A] Payor: BLUE [employer]OSS / Plan: [address] [place] [address] / Product Type: PPO /  Onset Date:18; right inguinal hernia repair 10/19/18 Prior Hospitalization: see medical history Provider#: [id number] Medications: Verified on Patient Summary List   
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 
 
 
Visits from Start of Care: 15    Missed Visits: 0 Reporting Period : 18 to 18 Summary of Care: 
 
Long Term Goals: To be accomplished in 6 weeks: 
             1. Improve FOTO to 55 to indicate improved function with daily activities. - FOTO 51%. 2018; FOTO 72%. 2018 
2. Increase B hip strength to grossly 4/5 to increase ease with community ambulation. - MMT (B) hips 3+/5 to 4/5 grossly. 2018 grossly 3+15/5 (2018) 3. Perform core exercises without rectus bulge or pain to indicate improve TA/stability for patient to return to work-progressing, able to correct rectus bulge with breathe cueing (12/10/2018) progressing, occasional rectus bulge though his core awareness is much improved (2018) 
 Pt reports high 90% overall improvement. FOTO 72%. ASSESSMENT/RECOMMENDATIONS: 
[x]Discontinue therapy: [x]Patient has reached or is progressing toward set goals []Patient is non-compliant or has abdicated 
    []Due to lack of appreciable progress towards set goals Emiliano Kebede, PT 2019 4:02 PM

## 2022-03-19 PROBLEM — K40.90 INGUINAL HERNIA: Status: ACTIVE | Noted: 2017-02-15

## 2023-01-31 RX ORDER — HYDROCODONE BITARTRATE AND ACETAMINOPHEN 10; 325 MG/1; MG/1
1 TABLET ORAL EVERY 6 HOURS PRN
COMMUNITY

## 2023-09-28 PROBLEM — I20.0 UNSTABLE ANGINA (HCC): Status: ACTIVE | Noted: 2023-09-28

## 2025-04-29 ENCOUNTER — HOSPITAL ENCOUNTER (OUTPATIENT)
Facility: HOSPITAL | Age: 65
Setting detail: SPECIMEN
Discharge: HOME | End: 2025-05-02

## 2025-04-29 LAB — LABCORP SPECIMEN COLLECTION: NORMAL

## 2025-04-29 PROCEDURE — 99001 SPECIMEN HANDLING PT-LAB: CPT
